# Patient Record
Sex: FEMALE | Race: BLACK OR AFRICAN AMERICAN | NOT HISPANIC OR LATINO | Employment: STUDENT | URBAN - METROPOLITAN AREA
[De-identification: names, ages, dates, MRNs, and addresses within clinical notes are randomized per-mention and may not be internally consistent; named-entity substitution may affect disease eponyms.]

---
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
## 2024-08-30 ENCOUNTER — HOSPITAL ENCOUNTER (EMERGENCY)
Facility: HOSPITAL | Age: 19
Discharge: HOME | End: 2024-08-30
Attending: EMERGENCY MEDICINE

## 2024-08-30 VITALS
HEART RATE: 78 BPM | WEIGHT: 175 LBS | SYSTOLIC BLOOD PRESSURE: 133 MMHG | DIASTOLIC BLOOD PRESSURE: 78 MMHG | TEMPERATURE: 99 F | OXYGEN SATURATION: 98 % | HEIGHT: 68 IN | BODY MASS INDEX: 26.52 KG/M2 | RESPIRATION RATE: 18 BRPM

## 2024-08-30 DIAGNOSIS — R23.8 BLISTERS OF MULTIPLE SITES: Primary | ICD-10-CM

## 2024-08-30 DIAGNOSIS — L30.1 DYSHIDROTIC ECZEMA: ICD-10-CM

## 2024-08-30 LAB
B-HCG UR QL: NEGATIVE
CTP QC/QA: YES

## 2024-08-30 PROCEDURE — 87593 ORTHOPOXVIRUS AMP PRB EACH: CPT | Mod: 91 | Performed by: EMERGENCY MEDICINE

## 2024-08-30 PROCEDURE — 86593 SYPHILIS TEST NON-TREP QUANT: CPT | Performed by: EMERGENCY MEDICINE

## 2024-08-30 PROCEDURE — 87593 ORTHOPOXVIRUS AMP PRB EACH: CPT | Performed by: EMERGENCY MEDICINE

## 2024-08-30 PROCEDURE — 96375 TX/PRO/DX INJ NEW DRUG ADDON: CPT

## 2024-08-30 PROCEDURE — 87529 HSV DNA AMP PROBE: CPT | Performed by: EMERGENCY MEDICINE

## 2024-08-30 PROCEDURE — 63600175 PHARM REV CODE 636 W HCPCS: Performed by: EMERGENCY MEDICINE

## 2024-08-30 PROCEDURE — 81025 URINE PREGNANCY TEST: CPT | Performed by: EMERGENCY MEDICINE

## 2024-08-30 PROCEDURE — 96374 THER/PROPH/DIAG INJ IV PUSH: CPT

## 2024-08-30 PROCEDURE — 99284 EMERGENCY DEPT VISIT MOD MDM: CPT | Mod: 25

## 2024-08-30 RX ORDER — DEXAMETHASONE SODIUM PHOSPHATE 4 MG/ML
12 INJECTION, SOLUTION INTRA-ARTICULAR; INTRALESIONAL; INTRAMUSCULAR; INTRAVENOUS; SOFT TISSUE
Status: COMPLETED | OUTPATIENT
Start: 2024-08-30 | End: 2024-08-30

## 2024-08-30 RX ORDER — DIPHENHYDRAMINE HYDROCHLORIDE 50 MG/ML
12.5 INJECTION INTRAMUSCULAR; INTRAVENOUS
Status: COMPLETED | OUTPATIENT
Start: 2024-08-30 | End: 2024-08-30

## 2024-08-30 RX ORDER — BETAMETHASONE DIPROPIONATE 0.5 MG/G
OINTMENT TOPICAL 2 TIMES DAILY
Qty: 45 G | Refills: 0 | Status: SHIPPED | OUTPATIENT
Start: 2024-08-30 | End: 2024-09-06

## 2024-08-30 RX ORDER — KETOROLAC TROMETHAMINE 30 MG/ML
15 INJECTION, SOLUTION INTRAMUSCULAR; INTRAVENOUS
Status: COMPLETED | OUTPATIENT
Start: 2024-08-30 | End: 2024-08-30

## 2024-08-30 RX ORDER — HYDROXYZINE HYDROCHLORIDE 25 MG/1
25 TABLET, FILM COATED ORAL
Qty: 30 TABLET | Refills: 0 | Status: SHIPPED | OUTPATIENT
Start: 2024-08-30

## 2024-08-30 RX ADMIN — DEXAMETHASONE SODIUM PHOSPHATE 12 MG: 4 INJECTION INTRA-ARTICULAR; INTRALESIONAL; INTRAMUSCULAR; INTRAVENOUS; SOFT TISSUE at 07:08

## 2024-08-30 RX ADMIN — DIPHENHYDRAMINE HYDROCHLORIDE 12.5 MG: 50 INJECTION INTRAMUSCULAR; INTRAVENOUS at 07:08

## 2024-08-30 RX ADMIN — KETOROLAC TROMETHAMINE 15 MG: 30 INJECTION, SOLUTION INTRAMUSCULAR at 07:08

## 2024-08-30 NOTE — DISCHARGE INSTRUCTIONS
I believe you have something called dyshidrotic eczema.  However viral swabs have been sent.  Recommend using the steroid ointment and following up with Dermatology.

## 2024-08-31 LAB — TREPONEMA PALLIDUM IGG+IGM AB [PRESENCE] IN SERUM OR PLASMA BY IMMUNOASSAY: NONREACTIVE

## 2024-08-31 NOTE — ED PROVIDER NOTES
Encounter Date: 8/30/2024       History     Chief Complaint   Patient presents with    Blister     Arrived in ED, c/o blisters to fingers of both hands x one week. Pt reports this has happened before but it usually goes away. Pt c/o pain and denies any itching. Pt denies having any other pain or complaints.      19-year-old female who states she has no known medical problems comes in complaining of painful discrete multiple small blisters to bilateral hand for 1 week.  Was seen at [facility] and placed on acyclovir.  Denies any type of headache, malaise, fevers, other symptomatology.  States she had something similar that was only on 1 hand of the nausea extensive and now is painful in the remote past.      Review of patient's allergies indicates:  No Known Allergies  History reviewed. No pertinent past medical history.  Past Surgical History:   Procedure Laterality Date    ABDOMINAL SURGERY      CYST REMOVAL       No family history on file.  Social History     Tobacco Use    Smoking status: Never    Smokeless tobacco: Never   Substance Use Topics    Alcohol use: Never    Drug use: Never     Review of Systems    Physical Exam     Initial Vitals [08/30/24 1726]   BP Pulse Resp Temp SpO2   (!) 156/89 86 16 98.7 °F (37.1 °C) 99 %      MAP       --         Physical Exam    Nursing note and vitals reviewed.  Constitutional: She appears well-developed and well-nourished.   Eyes: EOM are normal. Pupils are equal, round, and reactive to light.   Neck: Neck supple. No thyromegaly present. No JVD present.   Normal range of motion.  Cardiovascular:  Normal rate, regular rhythm, normal heart sounds and intact distal pulses.     Exam reveals no gallop and no friction rub.       No murmur heard.  Pulmonary/Chest: Breath sounds normal. No respiratory distress.   Abdominal: Abdomen is soft. Bowel sounds are normal.   Musculoskeletal:         General: No tenderness or edema. Normal range of motion.       Cervical back: Normal range of motion and neck supple.     Neurological: She is alert and oriented to person, place, and time. She has normal strength.   Skin: Skin is warm and dry.   Discrete round pustule as proximally 2 to 5 mm in size with a few that have become larger 1 cm in size and more irregularly shaped to bilateral fingers and hands.  There was no erythematous base is.  They are not in clusters per se.  All 5 fingers and palms and dorsum of the hands are involved.  It was not appear to be any anywhere else.  Patient appears to find the very tender but there was no erythema or induration or active drainage.         ED Course   Procedures  Labs Reviewed   HERPES SIMPLEX (HSV) BY RAPID PCR, NON-BLOOD       Result Value    HSV PCR Source Skin      Narrative:     Sources by Resulting Lab:->[facility]  Release to patient->Immediate   MPOX PCR   TREPONEMA PALLIDIUM ANTIBODIES IGG, IGM   POCT URINE PREGNANCY    POC Preg Test, Ur Negative       Acceptable Yes            Imaging Results    None          Medications   dexAMETHasone injection 12 mg (12 mg Intravenous Given 8/30/24 1928)   ketorolac injection 15 mg (15 mg Intravenous Given 8/30/24 1926)   diphenhydrAMINE injection 12.5 mg (12.5 mg Intravenous Given 8/30/24 1927)     Medical Decision Making  Amount and/or Complexity of Data Reviewed  Labs: ordered.    Risk  Prescription drug management.    Most likely a dyshidrotic eczema although the pain out of proportion is not impossible someone unusual.  Will give Decadron.  Will prescribe high potency steroid cream.  I do not feel the patient needs to continue to take her acyclovir.  But viral swabs were taken.  Both for monkey pox and herpes.  RPR was done and negative.  Recommend follow up with Dermatology.                                  Clinical Impression:  Final diagnoses:  [R23.8] Blisters of multiple sites (Primary)  [L30.1] Dyshidrotic eczema          ED Disposition Condition    Discharge  Stable          ED Prescriptions       Medication Sig Dispense Start Date End Date Auth. Provider    betamethasone dipropionate (DIPROLENE) 0.05 % ointment Apply topically 2 (two) times daily. for 7 days 45 g 8/30/2024 9/6/2024 Reji Rizo MD    hydrOXYzine HCL (ATARAX) 25 MG tablet Take 1 tablet (25 mg total) by mouth every 6 to 8 hours as needed for Itching. 30 tablet 8/30/2024 -- Reji Rizo MD          Follow-up Information       Follow up With Specialties Details Why Contact Info    Brady Bangura MD Pediatrics Schedule an appointment as soon as possible for a visit   [address] [place] [address]  [id number]  [facility] [address]  [phone]      Zita Burgess MD Dermatology Schedule an appointment as soon as possible for a visit   [address] [facility] [address]  [phone]               Reji Rizo MD  08/31/24 7860

## 2024-09-04 LAB
HSV1 DNA SPEC QL NAA+PROBE: NEGATIVE
HSV2 DNA SPEC QL NAA+PROBE: NEGATIVE
SPECIMEN SOURCE: NORMAL

## 2024-09-06 LAB
MVPX DNA SPEC QL NAA+PROBE: NOT DETECTED
ORTHOPOXVIRUS, PCR: NOT DETECTED